# Patient Record
Sex: FEMALE | Race: WHITE | NOT HISPANIC OR LATINO | Employment: OTHER | URBAN - METROPOLITAN AREA
[De-identification: names, ages, dates, MRNs, and addresses within clinical notes are randomized per-mention and may not be internally consistent; named-entity substitution may affect disease eponyms.]

---

## 2018-05-17 ENCOUNTER — ANESTHESIA EVENT (OUTPATIENT)
Dept: ENDOSCOPY | Facility: HOSPITAL | Age: 68
End: 2018-05-17
Payer: MEDICARE

## 2018-05-18 ENCOUNTER — HOSPITAL ENCOUNTER (OUTPATIENT)
Facility: HOSPITAL | Age: 68
Discharge: HOME | End: 2018-05-18
Attending: INTERNAL MEDICINE | Admitting: INTERNAL MEDICINE
Payer: MEDICARE

## 2018-05-18 ENCOUNTER — ANESTHESIA (OUTPATIENT)
Dept: ENDOSCOPY | Facility: HOSPITAL | Age: 68
End: 2018-05-18
Payer: MEDICARE

## 2018-05-18 VITALS
RESPIRATION RATE: 20 BRPM | TEMPERATURE: 97.8 F | WEIGHT: 293 LBS | BODY MASS INDEX: 51.91 KG/M2 | HEART RATE: 69 BPM | HEIGHT: 63 IN | SYSTOLIC BLOOD PRESSURE: 147 MMHG | OXYGEN SATURATION: 96 % | DIASTOLIC BLOOD PRESSURE: 67 MMHG

## 2018-05-18 DIAGNOSIS — Z86.0100 HISTORY OF COLON POLYPS: ICD-10-CM

## 2018-05-18 PROCEDURE — 63600000 HC DRUGS/DETAIL CODE: Performed by: NURSE ANESTHETIST, CERTIFIED REGISTERED

## 2018-05-18 PROCEDURE — 88305 TISSUE EXAM BY PATHOLOGIST: CPT | Performed by: INTERNAL MEDICINE

## 2018-05-18 PROCEDURE — 37000002 HC ANESTHESIA MAC: Performed by: INTERNAL MEDICINE

## 2018-05-18 PROCEDURE — 75000020 HC COLONSCOPY SNARE: Performed by: INTERNAL MEDICINE

## 2018-05-18 PROCEDURE — 25000000 HC PHARMACY GENERAL: Performed by: NURSE ANESTHETIST, CERTIFIED REGISTERED

## 2018-05-18 PROCEDURE — 0DBK8ZZ EXCISION OF ASCENDING COLON, VIA NATURAL OR ARTIFICIAL OPENING ENDOSCOPIC: ICD-10-PCS | Performed by: INTERNAL MEDICINE

## 2018-05-18 PROCEDURE — 25800000 HC PHARMACY IV SOLUTIONS: Performed by: NURSE ANESTHETIST, CERTIFIED REGISTERED

## 2018-05-18 RX ORDER — PROPOFOL 200MG/20ML
SYRINGE (ML) INTRAVENOUS AS NEEDED
Status: DISCONTINUED | OUTPATIENT
Start: 2018-05-18 | End: 2018-05-18 | Stop reason: SURG

## 2018-05-18 RX ORDER — PROPOFOL 10 MG/ML
INJECTION, EMULSION INTRAVENOUS CONTINUOUS PRN
Status: DISCONTINUED | OUTPATIENT
Start: 2018-05-18 | End: 2018-05-18 | Stop reason: SURG

## 2018-05-18 RX ORDER — SODIUM CHLORIDE 9 MG/ML
INJECTION, SOLUTION INTRAVENOUS CONTINUOUS PRN
Status: DISCONTINUED | OUTPATIENT
Start: 2018-05-18 | End: 2018-05-18 | Stop reason: SURG

## 2018-05-18 RX ORDER — LIDOCAINE HYDROCHLORIDE 10 MG/ML
INJECTION, SOLUTION EPIDURAL; INFILTRATION; INTRACAUDAL; PERINEURAL AS NEEDED
Status: DISCONTINUED | OUTPATIENT
Start: 2018-05-18 | End: 2018-05-18 | Stop reason: SURG

## 2018-05-18 RX ORDER — ROSUVASTATIN CALCIUM 5 MG/1
5 TABLET, COATED ORAL EVERY OTHER DAY
COMMUNITY

## 2018-05-18 RX ORDER — VERAPAMIL HCL 240 MG
240 TABLET, EXTENDED RELEASE ORAL NIGHTLY
COMMUNITY

## 2018-05-18 RX ORDER — NABUMETONE 750 MG/1
750 TABLET, FILM COATED ORAL 2 TIMES DAILY
COMMUNITY

## 2018-05-18 RX ORDER — GLYCOPYRROLATE 0.6MG/3ML
SYRINGE (ML) INTRAVENOUS AS NEEDED
Status: DISCONTINUED | OUTPATIENT
Start: 2018-05-18 | End: 2018-05-18 | Stop reason: SURG

## 2018-05-18 RX ADMIN — PROPOFOL 30 MG: 10 INJECTION, EMULSION INTRAVENOUS at 12:58

## 2018-05-18 RX ADMIN — PROPOFOL 125 MCG/KG/MIN: 10 INJECTION, EMULSION INTRAVENOUS at 12:58

## 2018-05-18 RX ADMIN — LIDOCAINE HYDROCHLORIDE 5 ML: 10 INJECTION, SOLUTION EPIDURAL; INFILTRATION; INTRACAUDAL; PERINEURAL at 12:58

## 2018-05-18 RX ADMIN — SODIUM CHLORIDE: 9 INJECTION, SOLUTION INTRAVENOUS at 12:47

## 2018-05-18 RX ADMIN — PROPOFOL 10 MG: 10 INJECTION, EMULSION INTRAVENOUS at 13:01

## 2018-05-18 RX ADMIN — PROPOFOL 20 MG: 10 INJECTION, EMULSION INTRAVENOUS at 12:59

## 2018-05-18 RX ADMIN — GLYCOPYRROLATE 0.2 MG: 0.2 INJECTION INTRAMUSCULAR; INTRAVENOUS at 12:48

## 2018-05-18 RX ADMIN — PROPOFOL 20 MG: 10 INJECTION, EMULSION INTRAVENOUS at 13:02

## 2018-05-18 ASSESSMENT — PAIN SCALES - GENERAL: PAIN_LEVEL: 0

## 2018-05-18 NOTE — ANESTHESIA POSTPROCEDURE EVALUATION
Patient: Veronica Constantino    Procedure Summary     Date:  05/18/18 Room / Location:  [facility] (D[facility]    Anesthesia Start:  1247 Anesthesia Stop:  1324    Procedure:  FLEXIBLE COLONOSCOPY PROXIMAL TO SPLENIC FLEXURE WITH REMOVAL OF TUMOR USING SNARE (N/A Anus) Diagnosis:  (Personal History)    Provider:  Moo Bliss MD Responsible Provider:  Christopher Dominguez MD    Anesthesia Type:  MAC ASA Status:  3          Anesthesia Type: MAC  PACU Vitals  5/18/2018 1319 - 5/18/2018 1339      5/18/2018 1323             BP: (!)  100/49    Temp: 36.6 °C (97.8 °F)    Pulse: 60    Resp: 14    SpO2: 98 %            Anesthesia Post Evaluation    Pain score: 0  Pain management: satisfactory to patient  Mode of pain management: IV medication  Patient location during evaluation: PACU  Patient participation: complete - patient participated  Level of consciousness: awake and alert  Cardiovascular status: acceptable  Airway Patency: adequate  Respiratory status: acceptable  Hydration status: stable  Anesthetic complications: no

## 2018-05-18 NOTE — H&P
"   GI Consult Note          Subjective   Veronica Constantino is a 68 y.o. female who was admitted for Personal History of adenomatous Colon polyps, with last study performed in 2013. Has a father with Colon Cancer history as well.        Past Medical History:   Diagnosis Date   • Arthritis    • Bladder infection    • Gallstones    • Hemorrhoids    • Hiatal hernia    • HTN (hypertension)    • Hypercholesterolemia      Past Surgical History:   Procedure Laterality Date   • APPENDECTOMY     • CHOLECYSTECTOMY     • COLONOSCOPY       Allergies   Allergen Reactions   • Augmentin [Amoxicillin-Pot Clavulanate]      Abdominal pain         Home Medications:  •  ascorbate calcium (VITAMIN C ORAL), Take by mouth.  •  Bifidobacterium infantis (ALIGN ORAL), Take by mouth.  •  folic acid/multivit-min/lutein (CENTRUM SILVER ORAL), Take by mouth.  •  nabumetone, Take 750 mg by mouth 2 (two) times a day.  •  rosuvastatin, Take 5 mg by mouth daily.  •  valsartan-hydrochlorothiazide (DIOVAN HCT) 160-12.5 combo dose, Take by mouth daily.  •  verapamil SR, Take 240 mg by mouth nightly.        Physical Exam    Physical Exam  BP (!) 150/72   Pulse 90   Temp (!) 35.8 °C (96.5 °F) (Tympanic)   Resp 20   Ht 1.6 m (5' 3\")   Wt 136 kg (300 lb)   SpO2 95%   BMI 53.14 kg/m²     General appearance: no distress  Head: normocephalic  Lungs: clear to auscultation anteriorly   Heart: regular rate   Abdomen: soft, non-tender; bowel sounds normal; no masses, no organomegaly,obese  Neurologic: awake and alert and oriented        Assessment 68 year old female for 5 year follow up Colonoscopy with a personal history of polyps and a family history of Colon Cancer.               Moo Bliss MD  5/18/2018       "

## 2018-05-18 NOTE — ANESTHESIA PREPROCEDURE EVALUATION
Anesthesia ROS/MED HX      Pulmonary    Sleep apnea (suspected )  Cardiovascular   dyslipidemia   hypertension  GI/Hepatic   Hiatal hernia  Endo/Other   Body Habitus: Morbidly Obese      Past Surgical History:   Procedure Laterality Date   • APPENDECTOMY     • CHOLECYSTECTOMY     • COLONOSCOPY         Physical Exam    Airway   Mallampati: II   TM distance: >3 FB   Neck ROM: full  Cardiovascular    Rhythm: regular   Rate: normal  Pulmonary    Decreased breath sounds  Other Findings   Enlarged tonsils, submandibular fullness         Anesthesia Plan    Plan: MAC    Technique: MAC     Airway: natural airway / supplemental oxygen   ASA 3  Anesthetic plan and risks discussed with: patient  Induction:    intravenous

## 2018-05-18 NOTE — OP NOTE
_______________________________________________________________________________  Patient Name: Veronica Constantino          Procedure Date: 5/18/2018 12:42 PM  MRN: [id number]                     Account Number: [id number]  YOB: 1950              Age: 68  Gender: Female                        Note Status: Finalized  Attending MD: NICHOLAS POLANCO MD  _______________________________________________________________________________  Procedure:           Colonoscopy  Indications:         Colon cancer screening in patient at increased risk:  Colorectal cancer in father, High risk colon cancer  surveillance: Personal history of adenoma less than 10  mm in size, Last colonoscopy 5 years ago  Providers:           NICHOLAS POLANCO MD (Doctor), Karina Christy RN  (Nurse)  Referring MD:        Julio Ruggiero MD  Requesting Provider:  Medicines:           See the Anesthesia note for documentation of the  administered medications  Complications:       No immediate complications.  _______________________________________________________________________________  Procedure:           After I obtained informed consent, the scope was passed  under direct vision. Throughout the procedure, the  patient's blood pressure, pulse, and oxygen saturations  were monitored continuously. The Colonoscope was  introduced through the anus and advanced to the cecum,  identified by appendiceal orifice and ileocecal valve.  The colonoscopy was performed without difficulty. The  patient tolerated the procedure well. The quality of the  bowel preparation was adequate. The ileocecal valve,  appendiceal orifice, and rectum were photographed.  Findings:  Scattered small and large-mouthed diverticula were found in the sigmoid  colon and in the descending colon.  A 6 mm polyp was found in the ascending colon. The polyp was sessile.  The polyp was removed with a cold snare. Resection and retrieval were  complete. Estimated blood loss was  minimal.  The exam was otherwise without abnormality.  The retroflexed view of the distal rectum and anal verge was normal and  showed no anal or rectal abnormalities.  Impression:          - Diverticulosis in the sigmoid colon and in the  descending colon.  - One 6 mm polyp in the ascending colon, removed with a  cold snare. Resected and retrieved.  - The examination was otherwise normal.  - The distal rectum and anal verge are normal on  retroflexion view.  Recommendation:      - Patient has a contact number available for  emergencies. The signs and symptoms of potential delayed  complications were discussed with the patient. Return to  normal activities tomorrow. Written discharge  instructions were provided to the patient.  - Resume previous diet today.  - Discontinue aspirin and NSAIDs for 5 days.  - Continue present medications.  - Await pathology results.  - Repeat colonoscopy in 5 years for surveillance based  on pathology results.  - Telephone endoscopist for pathology results in 1 week.  Procedure Code(s):   --- Professional ---  37698, Colonoscopy, flexible; with removal of tumor(s),  polyp(s), or other lesion(s) by snare technique  Diagnosis Code(s):   --- Professional ---  Z80.0, Family history of malignant neoplasm of digestive  organs  Z86.010, Personal history of colonic polyps  D12.2, Benign neoplasm of ascending colon  K57.30, Diverticulosis of large intestine without  perforation or abscess without bleeding  CPT copyright 2015 [employer]. All rights reserved.  The codes documented in this report are preliminary and upon  review may  be revised to meet current compliance requirements.  Attending Participation:  I personally performed the entire procedure.  ________________  NICHOLAS POLANCO MD  5/18/2018 1:19:07 PM  This report has been signed electronically.  Number of Addenda: 0  Note Initiated On: 5/18/2018 12:42 PM

## 2018-05-21 LAB
CASE RPRT: NORMAL
CLINICAL INFO: NORMAL
PATH REPORT.FINAL DX SPEC: NORMAL
PATH REPORT.GROSS SPEC: NORMAL

## 2018-12-13 ENCOUNTER — HOSPITAL ENCOUNTER (OUTPATIENT)
Dept: RADIOLOGY | Age: 68
Discharge: HOME | End: 2018-12-13
Attending: FAMILY MEDICINE
Payer: MEDICARE

## 2018-12-13 ENCOUNTER — TRANSCRIBE ORDERS (OUTPATIENT)
Dept: RADIOLOGY | Age: 68
End: 2018-12-13

## 2018-12-13 DIAGNOSIS — M25.552 PAIN IN LEFT HIP: Primary | ICD-10-CM

## 2018-12-13 DIAGNOSIS — M25.552 PAIN IN LEFT HIP: ICD-10-CM

## 2018-12-13 PROCEDURE — 73502 X-RAY EXAM HIP UNI 2-3 VIEWS: CPT | Mod: LT

## 2019-11-02 ENCOUNTER — HOSPITAL ENCOUNTER (OUTPATIENT)
Dept: RADIOLOGY | Age: 69
Discharge: HOME | End: 2019-11-02
Attending: FAMILY MEDICINE
Payer: MEDICARE

## 2019-11-02 ENCOUNTER — TRANSCRIBE ORDERS (OUTPATIENT)
Dept: RADIOLOGY | Age: 69
End: 2019-11-02

## 2019-11-02 DIAGNOSIS — M25.512 PAIN IN LEFT SHOULDER: Primary | ICD-10-CM

## 2019-11-02 DIAGNOSIS — M25.512 PAIN IN LEFT SHOULDER: ICD-10-CM

## 2019-11-02 PROCEDURE — 73030 X-RAY EXAM OF SHOULDER: CPT | Mod: LT

## 2020-08-06 ENCOUNTER — HOSPITAL ENCOUNTER (OUTPATIENT)
Dept: RADIOLOGY | Age: 70
Discharge: HOME | End: 2020-08-06
Attending: FAMILY MEDICINE
Payer: MEDICARE

## 2020-08-06 ENCOUNTER — TRANSCRIBE ORDERS (OUTPATIENT)
Dept: RADIOLOGY | Age: 70
End: 2020-08-06

## 2020-08-06 DIAGNOSIS — M79.641 PAIN IN RIGHT HAND: Primary | ICD-10-CM

## 2020-08-06 DIAGNOSIS — M79.641 PAIN IN RIGHT HAND: ICD-10-CM

## 2020-08-06 PROCEDURE — 73130 X-RAY EXAM OF HAND: CPT | Mod: RT

## 2020-11-03 ENCOUNTER — APPOINTMENT (OUTPATIENT)
Dept: LAB | Facility: HOSPITAL | Age: 70
End: 2020-11-03
Attending: INTERNAL MEDICINE
Payer: MEDICARE

## 2020-11-03 DIAGNOSIS — E66.01 MORBID (SEVERE) OBESITY DUE TO EXCESS CALORIES (CMS/HCC): ICD-10-CM

## 2020-11-03 DIAGNOSIS — E78.5 HYPERLIPIDEMIA, UNSPECIFIED: ICD-10-CM

## 2020-11-03 DIAGNOSIS — I10 ESSENTIAL (PRIMARY) HYPERTENSION: ICD-10-CM

## 2020-11-03 DIAGNOSIS — M17.9 OSTEOARTHRITIS OF KNEE, UNSPECIFIED: ICD-10-CM

## 2020-11-03 PROCEDURE — 30099997 SPECIMEN PROCESSING

## 2021-04-15 DIAGNOSIS — Z23 ENCOUNTER FOR IMMUNIZATION: ICD-10-CM

## 2021-11-29 ENCOUNTER — HOSPITAL ENCOUNTER (EMERGENCY)
Facility: HOSPITAL | Age: 71
Discharge: HOME | End: 2021-11-29
Attending: EMERGENCY MEDICINE
Payer: MEDICARE

## 2021-11-29 ENCOUNTER — APPOINTMENT (EMERGENCY)
Dept: RADIOLOGY | Facility: HOSPITAL | Age: 71
End: 2021-11-29
Payer: MEDICARE

## 2021-11-29 VITALS
OXYGEN SATURATION: 95 % | DIASTOLIC BLOOD PRESSURE: 68 MMHG | HEART RATE: 85 BPM | SYSTOLIC BLOOD PRESSURE: 149 MMHG | RESPIRATION RATE: 18 BRPM | TEMPERATURE: 98.6 F

## 2021-11-29 DIAGNOSIS — M79.604 PAIN OF RIGHT LOWER EXTREMITY: Primary | ICD-10-CM

## 2021-11-29 PROCEDURE — 93971 EXTREMITY STUDY: CPT | Mod: RT

## 2021-11-29 PROCEDURE — 99283 EMERGENCY DEPT VISIT LOW MDM: CPT | Mod: 25

## 2021-11-29 ASSESSMENT — ENCOUNTER SYMPTOMS
DIARRHEA: 0
SHORTNESS OF BREATH: 0
ABDOMINAL PAIN: 0
ARTHRALGIAS: 1
DIZZINESS: 0
VOMITING: 0
FEVER: 0
NAUSEA: 0
DYSURIA: 0
CHILLS: 0
MYALGIAS: 1
HEMATURIA: 0
HEADACHES: 0

## 2021-11-29 NOTE — DISCHARGE INSTRUCTIONS
Return to the ED for worsening of symptoms or any problems or concerns.  It is very important to follow up with your healthcare provider for re-evaluation.  Please have a repeat US in 7-10 days if you still have persistent symptoms

## 2021-11-29 NOTE — ED ATTESTATION NOTE
I have personally seen and examined the patient.  I reviewed and agree with physician assistant / nurse practitioner’s assessment and plan of care.     Exam: Evaluation of the patient's right lower extremity reveals mild lateral calf tenderness.  There is no cord appreciated.  Homans' sign is negative.  The extremity is neurovascularly intact.    Plan: Patient was sent in for ultrasound to rule out DVT.  Study was obtained and is negative.  Recommend routine follow-up with her orthopedic surgeon for likely musculoskeletal causes           Domingo Emanuel,   11/29/21 [id number]

## 2021-11-29 NOTE — ED PROVIDER NOTES
Emergency Medicine Note  HPI   HISTORY OF PRESENT ILLNESS     71 YOF with a PMH of arthritis, HTN presents to ED for R leg pain x 5 days patient states since Thursday she has been having right-sided leg pain, patient states she first noticed it when getting in the car to go to Thanksgiving dinner, she states the car was only 15 minutes.  She since been resting and taking NSAIDs with some relief of her pain.  She denies swelling redness or weakness of the leg.  Denies history of DVTs or PEs.  She called her doctor Dr. Freitas sports medicine who told her to come to the ED for rule out  DVT.            Patient History   PAST HISTORY     Reviewed from Nursing Triage:      Past Medical History:   Diagnosis Date   • Arthritis    • Bladder infection    • Gallstones    • Hemorrhoids    • Hiatal hernia    • HTN (hypertension)    • Hypercholesterolemia        Past Surgical History:   Procedure Laterality Date   • APPENDECTOMY     • CHOLECYSTECTOMY     • COLONOSCOPY         History reviewed. No pertinent family history.    Social History     Tobacco Use   • Smoking status: Never Smoker   • Smokeless tobacco: Never Used   Substance Use Topics   • Alcohol use: No   • Drug use: No         Review of Systems   REVIEW OF SYSTEMS     Review of Systems   Constitutional: Negative for chills and fever.   Eyes: Negative for visual disturbance.   Respiratory: Negative for shortness of breath.    Cardiovascular: Negative for chest pain.   Gastrointestinal: Negative for abdominal pain, diarrhea, nausea and vomiting.   Genitourinary: Negative for dysuria and hematuria.   Musculoskeletal: Positive for arthralgias and myalgias.   Neurological: Negative for dizziness and headaches.         VITALS     ED Vitals    Date/Time Temp Pulse Resp BP SpO2 [facility]   11/29/21 1333 37 °C (98.6 °F) 85 18 149/68 95 % BL        Pulse Ox %: 95 % (11/29/21 1712)  Pulse Ox Interpretation: Normal (11/29/21 1712)  Heart Rate: 85 (11/29/21 1712)  Rhythm Strip  Interpretation: Normal Sinus Rhythm (11/29/21 1712)     Physical Exam   PHYSICAL EXAM     Physical Exam  Vitals and nursing note reviewed.   Constitutional:       Appearance: She is well-developed.   HENT:      Head: Normocephalic and atraumatic.   Eyes:      Conjunctiva/sclera: Conjunctivae normal.   Cardiovascular:      Rate and Rhythm: Normal rate and regular rhythm.   Pulmonary:      Effort: Pulmonary effort is normal.      Breath sounds: Normal breath sounds.   Abdominal:      General: There is no distension.      Palpations: Abdomen is soft. There is no mass.      Tenderness: There is no abdominal tenderness.   Musculoskeletal:         General: No deformity. Normal range of motion.      Cervical back: Normal range of motion.      Right knee: Tenderness present.      Comments: Mild tenderness overlying lateral R knee without swelling, erythema or warmth; no palpable mass  NV intact distally    Skin:     General: Skin is warm and dry.   Neurological:      General: No focal deficit present.      Mental Status: She is alert. Mental status is at baseline.   Psychiatric:         Behavior: Behavior normal.           PROCEDURES     Procedures     DATA     Results     None          Imaging Results          US venous leg right (Final result)  Result time 11/29/21 14:42:24    Final result                 Impression:    IMPRESSION:  No sonographic evidence of acute deep venous thrombosis in the femoral-popliteal  system in the right lower extremity.    COMMENT:  Real-time duplex sonography of the deep veins of the right lower extremity was  performed with color and spectral Doppler including compression.    The common femoral, superficial femoral, popliteal  and calf veins are normally  compressible with spontaneous phasic wave forms in the right lower extremity.  No intraluminal filling defect is identified. There is normal color Doppler  flow.                   Narrative:      CLINICAL HISTORY:Right lower extremity  pain                                No orders to display       Scoring tools                                 ED Course & MDM   MDM / ED COURSE and CLINICAL IMPRESSIONS     MDM    ED Course as of 11/30/21 0147 Mon Nov 29, 2021   1713 None patient's DVT study negative.  Patient informed to continue with symptomatic management and follow-up with Dr. Freitas.  Informed if she continues to have pain in 7 to 10 days to have a repeat ultrasound. [EF]      ED Course User Index  [EF] Frankel, Elena C, PA C         Clinical Impressions as of 11/30/21 0147   Pain of right lower extremity            Frankel, Elena C, PA C  11/30/21 0147

## 2023-02-21 ENCOUNTER — APPOINTMENT (OUTPATIENT)
Dept: LAB | Facility: HOSPITAL | Age: 73
End: 2023-02-21
Attending: INTERNAL MEDICINE
Payer: MEDICARE

## 2023-02-21 ENCOUNTER — TRANSCRIBE ORDERS (OUTPATIENT)
Dept: LAB | Facility: HOSPITAL | Age: 73
End: 2023-02-21

## 2023-02-21 DIAGNOSIS — E78.5 HYPERLIPIDEMIA, UNSPECIFIED: ICD-10-CM

## 2023-02-21 DIAGNOSIS — I10 ESSENTIAL (PRIMARY) HYPERTENSION: ICD-10-CM

## 2023-02-21 DIAGNOSIS — E78.5 HYPERLIPIDEMIA, UNSPECIFIED: Primary | ICD-10-CM

## 2023-02-21 DIAGNOSIS — E66.01 MORBID (SEVERE) OBESITY DUE TO EXCESS CALORIES (CMS/HCC): ICD-10-CM

## 2023-02-21 DIAGNOSIS — M17.9 OSTEOARTHRITIS OF KNEE, UNSPECIFIED: ICD-10-CM

## 2023-02-21 LAB
ALBUMIN SERPL-MCNC: 3.8 G/DL (ref 3.4–5)
ALP SERPL-CCNC: 60 IU/L (ref 35–126)
ALT SERPL-CCNC: 28 IU/L (ref 11–54)
ANION GAP SERPL CALC-SCNC: 12 MEQ/L (ref 3–15)
AST SERPL-CCNC: 27 IU/L (ref 15–41)
BASOPHILS # BLD: 0.04 K/UL (ref 0.01–0.1)
BASOPHILS NFR BLD: 0.6 %
BILIRUB SERPL-MCNC: 1 MG/DL (ref 0.3–1.2)
BUN SERPL-MCNC: 16 MG/DL (ref 8–20)
CALCIUM SERPL-MCNC: 9.9 MG/DL (ref 8.9–10.3)
CHLORIDE SERPL-SCNC: 104 MEQ/L (ref 98–109)
CHOLEST SERPL-MCNC: 126 MG/DL
CO2 SERPL-SCNC: 25 MEQ/L (ref 22–32)
CREAT SERPL-MCNC: 0.9 MG/DL (ref 0.6–1.1)
DIFFERENTIAL METHOD BLD: ABNORMAL
EOSINOPHIL # BLD: 0.15 K/UL (ref 0.04–0.36)
EOSINOPHIL NFR BLD: 2.4 %
ERYTHROCYTE [DISTWIDTH] IN BLOOD BY AUTOMATED COUNT: 12.5 % (ref 11.7–14.4)
FT4I SERPL CALC-MCNC: 4.45 (ref 2.1–3.8)
GFR SERPL CREATININE-BSD FRML MDRD: >60 ML/MIN/1.73M*2
GLUCOSE SERPL-MCNC: 93 MG/DL (ref 70–99)
HCT VFR BLDCO AUTO: 46.6 % (ref 35–45)
HDLC SERPL-MCNC: 27 MG/DL
HDLC SERPL: 4.7 {RATIO}
HGB BLD-MCNC: 15.4 G/DL (ref 11.8–15.7)
IMM GRANULOCYTES # BLD AUTO: 0.02 K/UL (ref 0–0.08)
IMM GRANULOCYTES NFR BLD AUTO: 0.3 %
LDH SERPL L TO P-CCNC: 228 IU/L (ref 98–192)
LDLC SERPL CALC-MCNC: 71 MG/DL
LYMPHOCYTES # BLD: 2.94 K/UL (ref 1.2–3.5)
LYMPHOCYTES NFR BLD: 47.4 %
MAGNESIUM SERPL-MCNC: 1.7 MG/DL (ref 1.8–2.5)
MCH RBC QN AUTO: 31.8 PG (ref 28–33.2)
MCHC RBC AUTO-ENTMCNC: 33 G/DL (ref 32.2–35.5)
MCV RBC AUTO: 96.1 FL (ref 83–98)
MONOCYTES # BLD: 0.59 K/UL (ref 0.28–0.8)
MONOCYTES NFR BLD: 9.5 %
NEUTROPHILS # BLD: 2.46 K/UL (ref 1.7–7)
NEUTS SEG NFR BLD: 39.8 %
NONHDLC SERPL-MCNC: 99 MG/DL
NRBC BLD-RTO: 0 %
PDW BLD AUTO: 11.8 FL (ref 9.4–12.3)
PHOSPHATE SERPL-MCNC: 3 MG/DL (ref 2.4–4.7)
PLATELET # BLD AUTO: 199 K/UL (ref 150–369)
POTASSIUM SERPL-SCNC: 4 MEQ/L (ref 3.6–5.1)
PROT SERPL-MCNC: 6 G/DL (ref 6–8.2)
RBC # BLD AUTO: 4.85 M/UL (ref 3.93–5.22)
SODIUM SERPL-SCNC: 141 MEQ/L (ref 136–144)
T4 SERPL-MCNC: 10.6 UG/DL (ref 5.9–12.2)
TRIGL SERPL-MCNC: 139 MG/DL (ref 30–149)
TSH SERPL DL<=0.05 MIU/L-ACNC: 2.22 MIU/L (ref 0.34–5.6)
URATE SERPL-MCNC: 6.4 MG/DL (ref 2.6–8)
WBC # BLD AUTO: 6.2 K/UL (ref 3.8–10.5)

## 2023-02-21 PROCEDURE — 84436 ASSAY OF TOTAL THYROXINE: CPT

## 2023-02-21 PROCEDURE — 84550 ASSAY OF BLOOD/URIC ACID: CPT

## 2023-02-21 PROCEDURE — 80061 LIPID PANEL: CPT

## 2023-02-21 PROCEDURE — 83615 LACTATE (LD) (LDH) ENZYME: CPT

## 2023-02-21 PROCEDURE — 84100 ASSAY OF PHOSPHORUS: CPT

## 2023-02-21 PROCEDURE — 84443 ASSAY THYROID STIM HORMONE: CPT

## 2023-02-21 PROCEDURE — 85025 COMPLETE CBC W/AUTO DIFF WBC: CPT

## 2023-02-21 PROCEDURE — 36415 COLL VENOUS BLD VENIPUNCTURE: CPT

## 2023-02-21 PROCEDURE — 83735 ASSAY OF MAGNESIUM: CPT

## 2023-02-21 PROCEDURE — 80053 COMPREHEN METABOLIC PANEL: CPT

## 2024-11-06 ENCOUNTER — TRANSCRIBE ORDERS (OUTPATIENT)
Dept: LAB | Facility: HOSPITAL | Age: 74
End: 2024-11-06

## 2024-11-06 ENCOUNTER — HOSPITAL ENCOUNTER (OUTPATIENT)
Dept: RADIOLOGY | Age: 74
Discharge: HOME | End: 2024-11-06
Attending: FAMILY MEDICINE
Payer: MEDICARE

## 2024-11-06 ENCOUNTER — TRANSCRIBE ORDERS (OUTPATIENT)
Dept: RADIOLOGY | Age: 74
End: 2024-11-06

## 2024-11-06 ENCOUNTER — APPOINTMENT (OUTPATIENT)
Dept: LAB | Facility: HOSPITAL | Age: 74
End: 2024-11-06
Attending: INTERNAL MEDICINE
Payer: MEDICARE

## 2024-11-06 DIAGNOSIS — D53.9 NUTRITIONAL ANEMIA, UNSPECIFIED: ICD-10-CM

## 2024-11-06 DIAGNOSIS — M79.641 PAIN IN RIGHT HAND: ICD-10-CM

## 2024-11-06 DIAGNOSIS — M25.511 PAIN IN RIGHT SHOULDER: ICD-10-CM

## 2024-11-06 DIAGNOSIS — R94.5 ABNORMAL RESULTS OF LIVER FUNCTION STUDIES: ICD-10-CM

## 2024-11-06 DIAGNOSIS — E03.9 HYPOTHYROIDISM, UNSPECIFIED: ICD-10-CM

## 2024-11-06 DIAGNOSIS — M54.2 CERVICALGIA: ICD-10-CM

## 2024-11-06 DIAGNOSIS — I10 ESSENTIAL (PRIMARY) HYPERTENSION: ICD-10-CM

## 2024-11-06 DIAGNOSIS — E78.00 PURE HYPERCHOLESTEROLEMIA: Primary | ICD-10-CM

## 2024-11-06 DIAGNOSIS — E78.00 PURE HYPERCHOLESTEROLEMIA: ICD-10-CM

## 2024-11-06 DIAGNOSIS — E55.9 VITAMIN D DEFICIENCY, UNSPECIFIED: ICD-10-CM

## 2024-11-06 DIAGNOSIS — Z13.820 ENCOUNTER FOR SCREENING FOR OSTEOPOROSIS: Primary | ICD-10-CM

## 2024-11-06 LAB
25(OH)D3 SERPL-MCNC: 20 NG/ML (ref 30–100)
ALBUMIN SERPL-MCNC: 4 G/DL (ref 3.5–5.7)
ALP SERPL-CCNC: 65 IU/L (ref 34–125)
ALT SERPL-CCNC: 15 IU/L (ref 7–52)
ANION GAP SERPL CALC-SCNC: 8 MEQ/L (ref 3–15)
AST SERPL-CCNC: 20 IU/L (ref 13–39)
BASOPHILS # BLD: 0.05 K/UL (ref 0.01–0.1)
BASOPHILS NFR BLD: 1 %
BILIRUB SERPL-MCNC: 0.7 MG/DL (ref 0.3–1.2)
BUN SERPL-MCNC: 15 MG/DL (ref 7–25)
CALCIUM SERPL-MCNC: 10 MG/DL (ref 8.6–10.3)
CHLORIDE SERPL-SCNC: 105 MEQ/L (ref 98–107)
CHOLEST SERPL-MCNC: 122 MG/DL
CO2 SERPL-SCNC: 28 MEQ/L (ref 21–31)
CREAT SERPL-MCNC: 0.8 MG/DL (ref 0.6–1.2)
DIFFERENTIAL METHOD BLD: ABNORMAL
EGFRCR SERPLBLD CKD-EPI 2021: >60 ML/MIN/1.73M*2
EOSINOPHIL # BLD: 0.2 K/UL (ref 0.04–0.36)
EOSINOPHIL NFR BLD: 3.9 %
ERYTHROCYTE [DISTWIDTH] IN BLOOD BY AUTOMATED COUNT: 12.5 % (ref 11.7–14.4)
FT4I SERPL CALC-MCNC: 5.38 (ref 2.1–3.8)
GLUCOSE SERPL-MCNC: 92 MG/DL (ref 70–99)
HCT VFR BLD AUTO: 45 % (ref 35–45)
HDLC SERPL-MCNC: 28 MG/DL
HDLC SERPL: 4.4 {RATIO}
HGB BLD-MCNC: 14.9 G/DL (ref 11.8–15.7)
IMM GRANULOCYTES # BLD AUTO: 0.01 K/UL (ref 0–0.08)
IMM GRANULOCYTES NFR BLD AUTO: 0.2 %
LDH SERPL L TO P-CCNC: 175 IU/L (ref 98–271)
LDLC SERPL CALC-MCNC: 62 MG/DL
LYMPHOCYTES # BLD: 1.74 K/UL (ref 1.2–3.5)
LYMPHOCYTES NFR BLD: 33.7 %
MAGNESIUM SERPL-MCNC: 1.7 MG/DL (ref 1.8–2.5)
MCH RBC QN AUTO: 32.3 PG (ref 28–33.2)
MCHC RBC AUTO-ENTMCNC: 33.1 G/DL (ref 32.2–35.5)
MCV RBC AUTO: 97.4 FL (ref 83–98)
MONOCYTES # BLD: 0.54 K/UL (ref 0.28–0.8)
MONOCYTES NFR BLD: 10.5 %
NEUTROPHILS # BLD: 2.62 K/UL (ref 1.7–7)
NEUTS SEG NFR BLD: 50.7 %
NONHDLC SERPL-MCNC: 94 MG/DL
NRBC BLD-RTO: 0 %
PHOSPHATE SERPL-MCNC: 3.1 MG/DL (ref 2.4–4.7)
PLATELET # BLD AUTO: 194 K/UL (ref 150–369)
PMV BLD AUTO: 12.8 FL (ref 9.4–12.3)
POTASSIUM SERPL-SCNC: 4.1 MEQ/L (ref 3.5–5.1)
PROT SERPL-MCNC: 6.8 G/DL (ref 6–8.2)
RBC # BLD AUTO: 4.62 M/UL (ref 3.93–5.22)
SODIUM SERPL-SCNC: 141 MEQ/L (ref 136–145)
T4 SERPL-MCNC: 11.2 UG/DL (ref 5.9–12.2)
TRIGL SERPL-MCNC: 159 MG/DL
TSH SERPL DL<=0.05 MIU/L-ACNC: 2.08 MIU/L (ref 0.34–5.6)
URATE SERPL-MCNC: 6.7 MG/DL (ref 2.3–6)
WBC # BLD AUTO: 5.16 K/UL (ref 3.8–10.5)

## 2024-11-06 PROCEDURE — 80061 LIPID PANEL: CPT

## 2024-11-06 PROCEDURE — 72050 X-RAY EXAM NECK SPINE 4/5VWS: CPT

## 2024-11-06 PROCEDURE — 84443 ASSAY THYROID STIM HORMONE: CPT

## 2024-11-06 PROCEDURE — 84100 ASSAY OF PHOSPHORUS: CPT

## 2024-11-06 PROCEDURE — 85025 COMPLETE CBC W/AUTO DIFF WBC: CPT

## 2024-11-06 PROCEDURE — 84550 ASSAY OF BLOOD/URIC ACID: CPT

## 2024-11-06 PROCEDURE — 73130 X-RAY EXAM OF HAND: CPT | Mod: RT

## 2024-11-06 PROCEDURE — 83615 LACTATE (LD) (LDH) ENZYME: CPT

## 2024-11-06 PROCEDURE — 82306 VITAMIN D 25 HYDROXY: CPT

## 2024-11-06 PROCEDURE — 36415 COLL VENOUS BLD VENIPUNCTURE: CPT

## 2024-11-06 PROCEDURE — 80053 COMPREHEN METABOLIC PANEL: CPT

## 2024-11-06 PROCEDURE — 83735 ASSAY OF MAGNESIUM: CPT

## 2024-11-06 PROCEDURE — 73030 X-RAY EXAM OF SHOULDER: CPT | Mod: RT

## 2024-11-06 PROCEDURE — 84436 ASSAY OF TOTAL THYROXINE: CPT

## 2025-01-14 ENCOUNTER — ANESTHESIA EVENT (OUTPATIENT)
Dept: ENDOSCOPY | Facility: HOSPITAL | Age: 75
End: 2025-01-14
Payer: MEDICARE

## 2025-01-15 ENCOUNTER — ANESTHESIA (OUTPATIENT)
Dept: ENDOSCOPY | Facility: HOSPITAL | Age: 75
End: 2025-01-15
Payer: MEDICARE

## 2025-01-15 ENCOUNTER — HOSPITAL ENCOUNTER (OUTPATIENT)
Facility: HOSPITAL | Age: 75
Discharge: HOME | End: 2025-01-15
Attending: INTERNAL MEDICINE | Admitting: INTERNAL MEDICINE
Payer: MEDICARE

## 2025-01-15 VITALS
HEART RATE: 59 BPM | SYSTOLIC BLOOD PRESSURE: 146 MMHG | OXYGEN SATURATION: 98 % | TEMPERATURE: 96.7 F | WEIGHT: 265 LBS | DIASTOLIC BLOOD PRESSURE: 65 MMHG | RESPIRATION RATE: 22 BRPM | HEIGHT: 63 IN | BODY MASS INDEX: 46.95 KG/M2

## 2025-01-15 DIAGNOSIS — Z86.0100 HX OF COLONIC POLYP: ICD-10-CM

## 2025-01-15 DIAGNOSIS — Z80.0 FAMILY HISTORY OF COLON CANCER: ICD-10-CM

## 2025-01-15 LAB
ATRIAL RATE: 67
P AXIS: 29
PR INTERVAL: 124
QRS DURATION: 90
QT INTERVAL: 430
QTC CALCULATION(BAZETT): 454
R AXIS: 13
T WAVE AXIS: 59
VENTRICULAR RATE: 67

## 2025-01-15 PROCEDURE — 93005 ELECTROCARDIOGRAM TRACING: CPT | Performed by: INTERNAL MEDICINE

## 2025-01-15 PROCEDURE — 71000001 HC PACU PHASE 1 INITIAL 30MIN: Performed by: INTERNAL MEDICINE

## 2025-01-15 PROCEDURE — 93010 ELECTROCARDIOGRAM REPORT: CPT | Performed by: INTERNAL MEDICINE

## 2025-01-15 PROCEDURE — C1773 RET DEV, INSERTABLE: HCPCS | Performed by: INTERNAL MEDICINE

## 2025-01-15 PROCEDURE — 0DBL8ZX EXCISION OF TRANSVERSE COLON, VIA NATURAL OR ARTIFICIAL OPENING ENDOSCOPIC, DIAGNOSTIC: ICD-10-PCS | Performed by: INTERNAL MEDICINE

## 2025-01-15 PROCEDURE — 0DBF8ZX EXCISION OF RIGHT LARGE INTESTINE, VIA NATURAL OR ARTIFICIAL OPENING ENDOSCOPIC, DIAGNOSTIC: ICD-10-PCS | Performed by: INTERNAL MEDICINE

## 2025-01-15 PROCEDURE — 75000020 HC COLONSCOPY SNARE: Performed by: INTERNAL MEDICINE

## 2025-01-15 PROCEDURE — 63600000 HC DRUGS/DETAIL CODE: Mod: JZ

## 2025-01-15 PROCEDURE — 25800000 HC PHARMACY IV SOLUTIONS

## 2025-01-15 PROCEDURE — 71000011 HC PACU PHASE 1 EA ADDL MIN: Performed by: INTERNAL MEDICINE

## 2025-01-15 PROCEDURE — 88305 TISSUE EXAM BY PATHOLOGIST: CPT | Performed by: INTERNAL MEDICINE

## 2025-01-15 PROCEDURE — 37000001 HC ANESTHESIA GENERAL: Performed by: INTERNAL MEDICINE

## 2025-01-15 RX ORDER — SODIUM CHLORIDE 9 MG/ML
INJECTION, SOLUTION INTRAVENOUS CONTINUOUS PRN
Status: DISCONTINUED | OUTPATIENT
Start: 2025-01-15 | End: 2025-01-15 | Stop reason: SURG

## 2025-01-15 RX ORDER — LIDOCAINE HCL/PF 100 MG/5ML
SYRINGE (ML) INTRAVENOUS AS NEEDED
Status: DISCONTINUED | OUTPATIENT
Start: 2025-01-15 | End: 2025-01-15 | Stop reason: SURG

## 2025-01-15 RX ORDER — PROPOFOL 10 MG/ML
INJECTION, EMULSION INTRAVENOUS AS NEEDED
Status: DISCONTINUED | OUTPATIENT
Start: 2025-01-15 | End: 2025-01-15 | Stop reason: SURG

## 2025-01-15 RX ADMIN — SODIUM CHLORIDE: 9 INJECTION, SOLUTION INTRAVENOUS at 09:31

## 2025-01-15 RX ADMIN — PROPOFOL 125 MCG/KG/MIN: 10 INJECTION, EMULSION INTRAVENOUS at 09:37

## 2025-01-15 RX ADMIN — PROPOFOL 30 MG: 10 INJECTION, EMULSION INTRAVENOUS at 09:42

## 2025-01-15 RX ADMIN — LIDOCAINE HYDROCHLORIDE 100 MG: 20 INJECTION, SOLUTION INTRAVENOUS at 09:38

## 2025-01-15 RX ADMIN — PROPOFOL 50 MG: 10 INJECTION, EMULSION INTRAVENOUS at 09:38

## 2025-01-15 NOTE — ANESTHESIA PREPROCEDURE EVALUATION
Relevant Problems   No relevant active problems       Anesthesia ROS/MED HX    Anesthesia History - neg  Pulmonary - neg  Neuro/Psych - neg  Cardiovascular   dyslipidemia   hypertension  Hematological - neg  GI/Hepatic   Hiatal hernia  Musculoskeletal- neg  Renal Disease- neg  Endo/Other- neg       Past Surgical History   Procedure Laterality Date    Appendectomy      Cholecystectomy      Colonoscopy      FLEXIBLE COLONOSCOPY PROXIMAL TO SPLENIC FLEXURE WITH REMOVAL OF TUMOR USING SNARE N/A 5/18/2018    [place]oo [place] [facility]       Physical Exam    Airway   Mallampati: II   TM distance: >3 FB   Neck ROM: full  Cardiovascular - normal   Rhythm: regular   Rate: normalPulmonary - normal   clear to auscultation  Dental - normal        Anesthesia Plan    Plan: general    Technique: total IV anesthesia     Lines and Monitors: PIV     Airway: natural airway / supplemental oxygen    3 ASA  Induction:    intravenous

## 2025-01-15 NOTE — OP NOTE
_______________________________________________________________________________  Patient Name: Veronica Constantino          Procedure Date: 1/15/2025 9:18 AM  MRN: [id number]                     Account Number: [id number]  YOB: 1950              Age: 74  Gender: Female                        Note Status: Finalized  Attending MD: LUIS DE LA PAZ,  _______________________________________________________________________________  Procedure:             Colonoscopy  Indications:           Colon cancer screening in patient at increased risk:  Colorectal cancer in father, High risk colon cancer  surveillance: Personal history of adenoma less than 10  mm in size  Providers:             LUIS CRANE (Doctor)  Referring MD:          Julio Ruggiero  Requesting Provider:  Medicines:             See the Anesthesia note for documentation of the  administered medications  Complications:         No immediate complications.  _______________________________________________________________________________  Procedure:             After I obtained informed consent, the scope was  passed under direct vision. Throughout the procedure,  the patient's blood pressure, pulse, and oxygen  saturations were monitored continuously. The  colonoscope was introduced through the anus and  advanced to the terminal ileum, with identification of  the appendiceal orifice and IC valve. The colonoscopy  was performed without difficulty. The patient  tolerated the procedure well. The quality of the bowel  preparation was good. The terminal ileum, ileocecal  valve, appendiceal orifice, and rectum were  photographed.  Findings:  The terminal ileum appeared normal.  An 8 mm polyp was found in the ascending colon. The polyp was sessile.  The polyp was removed with a hot snare. Resection and retrieval were  complete. Estimated blood loss: none.  A 10 mm polyp was found in the transverse colon. The polyp was sessile.  The polyp was  removed with a hot snare. Resection and retrieval were  complete. Estimated blood loss: none.  Scattered large-mouthed and small-mouthed diverticula were found in the  hepatic flexure, ascending colon and cecum.  The exam was otherwise without abnormality.  The retroflexed view of the distal rectum and anal verge was normal and  showed no anal or rectal abnormalities.  Impression:            - The examined portion of the ileum was normal.  - One 8 mm polyp in the ascending colon, removed with  a hot snare. Resected and retrieved.  - One 10 mm polyp in the transverse colon, removed  with a hot snare. Resected and retrieved.  - Diverticulosis at the hepatic flexure, in the  ascending colon and in the cecum.  - The examination was otherwise normal.  - The distal rectum and anal verge are normal on  retroflexion view.  Recommendation:        - Patient has a contact number available for  emergencies. The signs and symptoms of potential  delayed complications were discussed with the patient.  Return to normal activities tomorrow. Written  discharge instructions were provided to the patient.  - Resume previous diet today.  - Continue present medications.  - No aspirin, ibuprofen, naproxen, or other  non-steroidal anti-inflammatory drugs for 5 days after  polyp removal.  - Await pathology results.  - Repeat colonoscopy in 3 years for surveillance based  on pathology results.  - Telephone endoscopist for pathology results in 10  days.  Procedure Code(s):     --- Professional ---  26349, Colonoscopy, flexible; with removal of  tumor(s), polyp(s), or other lesion(s) by snare  technique  Diagnosis Code(s):     --- Professional ---  Z80.0, Family history of malignant neoplasm of  digestive organs  Z86.010, Personal history of colonic polyps  D12.2, Benign neoplasm of ascending colon  D12.3, Benign neoplasm of transverse colon (hepatic  flexure or splenic flexure)  K57.30, Diverticulosis of large intestine without  perforation or  abscess without bleeding  CPT copyright 2023 American Medical Association. All rights reserved.  The codes documented in this report are preliminary and upon  review may  be revised to meet current compliance requirements.  Attending Participation:  I personally performed the entire procedure.  ______________________  NICHOLAS POLANCO MD~COLLIN  1/15/2025 10:01:27 AM  This report has been signed electronically.  Number of Addenda: 0  Note Initiated On: 1/15/2025 9:18 AM

## 2025-01-15 NOTE — ANESTHESIA POSTPROCEDURE EVALUATION
Patient: Veronica Constantino    Procedure Summary       Date: 01/15/25 Room / [facility] (B[facility]    Anesthesia Start: 0931 Anesthesia Stop: 1007    Procedure: Colonoscopy (Anus) Diagnosis:       Family history of colon cancer      Hx of colonic polyp      (fam hx of crc)      (per hx colon polyps)    Providers: Moo Bliss MD Responsible Provider: Theo Watters MD    Anesthesia Type: general ASA Status: 3            Anesthesia Type: general  PACU Vitals  1/15/2025 1001 - 1/15/2025 1057        1/15/2025  1004 1/15/2025  1011 1/15/2025  1020 1/15/2025  1030    BP: 114/58 133/59 148/66 146/65    Temp: 35.9 °C (96.7 °F) -- -- --    Pulse: 56 58 62 59    Resp: 20 20 20 22    SpO2: 100 % 100 % 99 % 98 %              Anesthesia Post Evaluation    Pain management: adequate  Patient location during evaluation: PACU  Patient participation: complete - patient participated  Level of consciousness: awake and alert  Cardiovascular status: acceptable  Airway Patency: adequate  Respiratory status: acceptable  Hydration status: acceptable  Anesthetic complications: no

## 2025-01-15 NOTE — ANESTHESIOLOGIST PRE-PROCEDURE ATTESTATION
Pre-Procedure Patient Identification:  I am the Primary Anesthesiologist and have identified the patient on 01/15/25 at 8:50 AM.   I have confirmed the procedure(s) will be performed by the following surgeon/proceduralist Moo Bliss MD.

## 2025-01-15 NOTE — H&P
"   GI Consult Note          Subjective   Veronica Constantino is a 74 y.o. female who was admitted for Family history of colon cancer [Z80.0]  Hx of colonic polyp [Z86.0100]. Last Colonoscopy in 2018 with one Adenoma. Father with a history of Colon Cancer as well.        Past Medical History:   Diagnosis Date    Arthritis     Bladder infection     Gallstones     Hemorrhoids     Hiatal hernia     HTN (hypertension)     Hypercholesterolemia      Past Surgical History   Procedure Laterality Date    Appendectomy      Cholecystectomy      Colonoscopy      FLEXIBLE COLONOSCOPY PROXIMAL TO SPLENIC FLEXURE WITH REMOVAL OF TUMOR USING SNARE N/A 5/18/2018    Performed by Moo Bliss MD at [facility]     Allergies   Allergen Reactions    Augmentin [Amoxicillin-Pot Clavulanate]      Abdominal pain         Home Medications:          Physical Exam    Physical Exam  Visit Vitals  BP (!) 184/84   Pulse 80   Temp 36.7 °C (98.1 °F) (Temporal)   Resp 18   Ht 1.6 m (5' 3\")   Wt 120 kg (265 lb)   SpO2 97%   BMI 46.94 kg/m²       General appearance: no distress  Head: normocephalic  Lungs: clear to auscultation anteriorly   Heart: regular rate   Abdomen: soft, non-tender; bowel sounds normal; no masses, no organomegaly  Neurologic: awake and alert and oriented        Assessment 74 year old female for follow up Colonoscopy with a family hx of Colon Cancer and a personal hx of Colon polyps.               Moo Bliss MD  1/15/2025         "

## 2025-02-23 ENCOUNTER — APPOINTMENT (OUTPATIENT)
Dept: RADIOLOGY | Age: 75
End: 2025-02-23
Attending: FAMILY MEDICINE
Payer: MEDICARE

## 2025-02-23 ENCOUNTER — HOSPITAL ENCOUNTER (OUTPATIENT)
Facility: CLINIC | Age: 75
Discharge: HOME | End: 2025-02-23
Attending: FAMILY MEDICINE
Payer: MEDICARE

## 2025-02-23 VITALS
TEMPERATURE: 98.1 F | HEART RATE: 75 BPM | DIASTOLIC BLOOD PRESSURE: 68 MMHG | SYSTOLIC BLOOD PRESSURE: 110 MMHG | OXYGEN SATURATION: 95 %

## 2025-02-23 DIAGNOSIS — R05.1 ACUTE COUGH: ICD-10-CM

## 2025-02-23 DIAGNOSIS — J01.00 ACUTE NON-RECURRENT MAXILLARY SINUSITIS: Primary | ICD-10-CM

## 2025-02-23 PROCEDURE — 99202 OFFICE O/P NEW SF 15 MIN: CPT | Performed by: FAMILY MEDICINE

## 2025-02-23 PROCEDURE — 71046 X-RAY EXAM CHEST 2 VIEWS: CPT | Performed by: FAMILY MEDICINE

## 2025-02-23 PROCEDURE — 87637 SARSCOV2&INF A&B&RSV AMP PRB: CPT | Performed by: FAMILY MEDICINE

## 2025-02-23 ASSESSMENT — ENCOUNTER SYMPTOMS
SHORTNESS OF BREATH: 0
FEVER: 1
ABDOMINAL PAIN: 0
COUGH: 1
CHILLS: 1
NAUSEA: 0
VOMITING: 0

## 2025-02-23 NOTE — ED PROVIDER NOTES
"History  Chief Complaint   Patient presents with    Cough     Productive cough of \"creamy\" yellow mucus x 4 days.     Intense cough with malaise.    Started 3 days ago.    Temp 101 Friday.  Yesterday, temp resolved.    Cough is worsening.        Cough  Associated symptoms: chills and fever    Associated symptoms: no chest pain and no shortness of breath        Past Medical History:   Diagnosis Date    Arthritis     Bladder infection     Gallstones     Hemorrhoids     Hiatal hernia     HTN (hypertension)     Hypercholesterolemia        Past Surgical History   Procedure Laterality Date    Appendectomy      Cholecystectomy      Colonoscopy      Colonoscopy N/A 1/15/2025    Performed by Moo Bliss MD at [facility]    FLEXIBLE COLONOSCOPY PROXIMAL TO SPLENIC FLEXURE WITH REMOVAL OF TUMOR USING SNARE N/A 5/18/2018    Performed by Moo Bliss MD at [facility]       No family history on file.    Social History     Tobacco Use    Smoking status: Never    Smokeless tobacco: Never   Substance Use Topics    Alcohol use: No    Drug use: No       Review of Systems   Constitutional:  Positive for chills and fever.   Respiratory:  Positive for cough. Negative for shortness of breath.    Cardiovascular:  Negative for chest pain.   Gastrointestinal:  Negative for abdominal pain, nausea and vomiting.       Physical Exam  ED Triage Vitals [02/23/25 1541]   Temp Heart Rate Resp BP SpO2   36.7 °C (98.1 °F) 75 -- 110/68 95 %      Temp Source Heart Rate Source Patient Position BP Location FiO2 (%) (Set)   Oral Monitor Sitting Right upper arm --       Physical Exam  Vitals and nursing note reviewed.   Constitutional:       General: She is not in acute distress.     Appearance: Normal appearance. She is well-developed. She is not ill-appearing.   HENT:      Head: Normocephalic and atraumatic.      Right Ear: Ear canal and external ear normal. A middle ear effusion is present. Tympanic membrane is not erythematous.      Left Ear: Ear canal and " external ear normal. Tympanic membrane is not erythematous.      Nose: Mucosal edema and congestion present.      Mouth/Throat:      Pharynx: Postnasal drip present. No posterior oropharyngeal erythema.   Eyes:      Conjunctiva/sclera: Conjunctivae normal.   Cardiovascular:      Rate and Rhythm: Normal rate and regular rhythm.   Pulmonary:      Effort: Pulmonary effort is normal. No tachypnea or respiratory distress.      Breath sounds: Normal breath sounds. No decreased breath sounds, wheezing, rhonchi or rales.   Musculoskeletal:      Cervical back: Neck supple.   Lymphadenopathy:      Cervical: Cervical adenopathy present.      Right cervical: Superficial cervical adenopathy present.      Left cervical: Superficial cervical adenopathy present.   Neurological:      Mental Status: She is alert.   Psychiatric:         Attention and Perception: Attention normal.         Mood and Affect: Mood normal.         Speech: Speech normal.         Behavior: Behavior normal. Behavior is cooperative.         Thought Content: Thought content normal.         Judgment: Judgment normal.           Procedures  Procedures    UC Course       Medical Decision Making  Symptoms:    Vitals are stable.    Pt appears well in the office today.    Lungs clear on exam.      POC testing performed:    CXR:  Narrative & Impression  CLINICAL HISTORY: Cough     COMMENT: PA and lateral views of the chest were obtained and compared to prior  study from 2017.     There is no focal consolidation, pleural effusion, or pneumothorax. The heart  and mediastinum are within normal limits. The visualized osseous structures are  intact.     --  IMPRESSION: No acute disease in the chest.    COVID-19,flu, rsv testing sent to [facility].   Treatment: Continue to treat symptoms as needed.      Low threshold to be seen in the ED if symptoms worsen at any time.   Otherwise, results will be available to view on [facility].                       Katy Juares MD  02/23/25  [id number]

## 2025-02-24 LAB
FLUAV RNA SPEC QL NAA+PROBE: POSITIVE
FLUBV RNA SPEC QL NAA+PROBE: NEGATIVE
RSV RNA SPEC QL NAA+PROBE: NEGATIVE
SARS-COV-2 RNA RESP QL NAA+PROBE: NEGATIVE

## 2025-04-15 ENCOUNTER — HOSPITAL ENCOUNTER (OUTPATIENT)
Facility: CLINIC | Age: 75
Discharge: HOME | End: 2025-04-15
Attending: EMERGENCY MEDICINE
Payer: MEDICARE

## 2025-04-15 ENCOUNTER — APPOINTMENT (OUTPATIENT)
Dept: RADIOLOGY | Age: 75
End: 2025-04-15
Payer: MEDICARE

## 2025-04-15 VITALS
SYSTOLIC BLOOD PRESSURE: 141 MMHG | HEART RATE: 110 BPM | TEMPERATURE: 98.3 F | OXYGEN SATURATION: 98 % | DIASTOLIC BLOOD PRESSURE: 75 MMHG

## 2025-04-15 DIAGNOSIS — S92.155A CLOSED NONDISPLACED AVULSION FRACTURE OF LEFT TALUS, INITIAL ENCOUNTER: Primary | ICD-10-CM

## 2025-04-15 PROCEDURE — 73630 X-RAY EXAM OF FOOT: CPT | Mod: LT

## 2025-04-15 PROCEDURE — 99213 OFFICE O/P EST LOW 20 MIN: CPT

## 2025-04-15 ASSESSMENT — ENCOUNTER SYMPTOMS
ABDOMINAL PAIN: 0
BACK PAIN: 0
NAUSEA: 0
WOUND: 0
HEADACHES: 0
ARTHRALGIAS: 1
VOMITING: 0
PALPITATIONS: 0
CHILLS: 0
COUGH: 0
FATIGUE: 0
JOINT SWELLING: 1
SHORTNESS OF BREATH: 0
DIARRHEA: 0
DIZZINESS: 0
COLOR CHANGE: 0
CONFUSION: 0
FEVER: 0
NUMBNESS: 0
LIGHT-HEADEDNESS: 0
WEAKNESS: 0

## 2025-04-15 NOTE — DISCHARGE INSTRUCTIONS
Keep the foot elevated, try to use ice to help with the swelling   The bruising may continue to move down your foot, this is normal due to gravity  Continue to wear supportive shoes, take tylenol if needed for pain    Follow up with orthopedics for repeat x-rays if the pain does not improve

## 2025-04-15 NOTE — ED PROVIDER NOTES
Emergency Medicine Note  HPI   HISTORY OF PRESENT ILLNESS     75 y.o. female presents for left foot swelling, bruising, and pain after a twisting injury last week.   She is able to bear weight, has full ROM. Admits to pain to the dorsal foot that is worse with pressing on the area and when she puts on her shoe.   She denies numbness, tingling, or weakness. She is not on any blood thinners.          Patient History   PAST HISTORY     Reviewed from Nursing Triage:       Past Medical History:   Diagnosis Date    Arthritis     Bladder infection     Gallstones     Hemorrhoids     Hiatal hernia     HTN (hypertension)     Hypercholesterolemia        Past Surgical History   Procedure Laterality Date    Appendectomy      Cholecystectomy      Colonoscopy      Colonoscopy N/A 1/15/2025    Performed by Moo Bliss MD at [facility]    FLEXIBLE COLONOSCOPY PROXIMAL TO SPLENIC FLEXURE WITH REMOVAL OF TUMOR USING SNARE N/A 5/18/2018    Performed by Moo Bliss MD at [facility]       No family history on file.    Social History     Tobacco Use    Smoking status: Never    Smokeless tobacco: Never   Substance Use Topics    Alcohol use: No    Drug use: No         Review of Systems   REVIEW OF SYSTEMS     Review of Systems   Constitutional:  Negative for chills, fatigue and fever.   Respiratory:  Negative for cough and shortness of breath.    Cardiovascular:  Negative for chest pain and palpitations.   Gastrointestinal:  Negative for abdominal pain, diarrhea, nausea and vomiting.   Musculoskeletal:  Positive for arthralgias and joint swelling. Negative for back pain and gait problem.   Skin:  Negative for color change, pallor and wound.   Neurological:  Negative for dizziness, weakness, light-headedness, numbness and headaches.   Psychiatric/Behavioral:  Negative for confusion.          VITALS     ED Vitals      Date/Time Temp Pulse Resp BP SpO2 [facility]   04/15/25 1917 36.8 °C (98.3 °F) 110 -- 141/75 98 % DS                         Physical  Exam   PHYSICAL EXAM     Physical Exam  Vitals reviewed.   Constitutional:       Appearance: Normal appearance.   HENT:      Head: Normocephalic.      Nose: Nose normal.      Mouth/Throat:      Mouth: Mucous membranes are moist.   Cardiovascular:      Rate and Rhythm: Normal rate and regular rhythm.      Pulses:           Dorsalis pedis pulses are 2+ on the left side.        Posterior tibial pulses are 2+ on the left side.   Pulmonary:      Effort: Pulmonary effort is normal.   Musculoskeletal:         General: Tenderness present. Normal range of motion.      Left foot: Normal range of motion.        Feet:    Feet:      Left foot:      Skin integrity: Skin integrity normal.      Comments: Ecchymosis to dorsal foot at base of toes 2-5 along with ecchymosis proximally to dorsal foot and lateral ankle  Skin:     General: Skin is warm and dry.      Capillary Refill: Capillary refill takes less than 2 seconds.   Neurological:      Mental Status: She is alert and oriented to person, place, and time.   Psychiatric:         Behavior: Behavior normal.           PROCEDURES     Procedures     DATA     Results       None                No orders to display       Scoring tools                                  ED Course & MDM   MDM / ED COURSE / CLINICAL IMPRESSION / DISPO     Medical Decision Making  Left foot x-ray obtained. Avulsion fracture of left talus noted. Reviewed with patient.   Recommend rest, ice, Tylenol PRN for pain   Use supportive footwear, keep legs elevated   Follow up with orthopedics for further management. Pt states she has appt with her orthopedist next week and will discuss at that time  Patient verbalized understanding, in agreement with plan. All questions answered.           Clinical Impression      Closed nondisplaced avulsion fracture of left talus, initial encounter     _________________       ED Disposition   Discharge                       Aimee Dupont CRNP  04/15/25 2004

## 2025-04-16 NOTE — ED ATTESTATION NOTE
I was immediately available to provide supervision and direction, if requested, for the care of the patient. I did not see the patient nor was I consulted for any questions by the DINORA.        Ismael Johnston MD  04/15/25 2009

## 2025-06-06 ENCOUNTER — TRANSCRIBE ORDERS (OUTPATIENT)
Dept: SCHEDULING | Age: 75
End: 2025-06-06

## 2025-06-06 DIAGNOSIS — M25.572 LEFT ANKLE PAIN: Primary | ICD-10-CM

## 2025-06-20 ENCOUNTER — APPOINTMENT (EMERGENCY)
Dept: RADIOLOGY | Facility: HOSPITAL | Age: 75
End: 2025-06-20
Payer: MEDICARE

## 2025-06-20 ENCOUNTER — HOSPITAL ENCOUNTER (EMERGENCY)
Facility: HOSPITAL | Age: 75
Discharge: HOME | End: 2025-06-20
Attending: STUDENT IN AN ORGANIZED HEALTH CARE EDUCATION/TRAINING PROGRAM
Payer: MEDICARE

## 2025-06-20 VITALS
DIASTOLIC BLOOD PRESSURE: 65 MMHG | SYSTOLIC BLOOD PRESSURE: 131 MMHG | RESPIRATION RATE: 18 BRPM | HEIGHT: 65 IN | WEIGHT: 250 LBS | BODY MASS INDEX: 41.65 KG/M2 | HEART RATE: 64 BPM | OXYGEN SATURATION: 97 %

## 2025-06-20 DIAGNOSIS — R42 LIGHTHEADEDNESS: Primary | ICD-10-CM

## 2025-06-20 DIAGNOSIS — E87.6 HYPOKALEMIA: ICD-10-CM

## 2025-06-20 DIAGNOSIS — R55 NEAR SYNCOPE: ICD-10-CM

## 2025-06-20 LAB
ALBUMIN SERPL-MCNC: 3.9 G/DL (ref 3.5–5.7)
ALP SERPL-CCNC: 59 IU/L (ref 34–125)
ALT SERPL-CCNC: 12 IU/L (ref 7–52)
ANION GAP SERPL CALC-SCNC: 14 MEQ/L (ref 3–15)
AST SERPL-CCNC: 15 IU/L (ref 13–39)
BASOPHILS # BLD: 0.03 K/UL (ref 0.01–0.1)
BASOPHILS NFR BLD: 0.4 %
BILIRUB SERPL-MCNC: 1.1 MG/DL (ref 0.3–1.2)
BUN SERPL-MCNC: 26 MG/DL (ref 7–25)
CALCIUM SERPL-MCNC: 10.4 MG/DL (ref 8.6–10.3)
CHLORIDE SERPL-SCNC: 102 MEQ/L (ref 98–107)
CO2 SERPL-SCNC: 24 MEQ/L (ref 21–31)
CREAT SERPL-MCNC: 1 MG/DL (ref 0.6–1.2)
DIFFERENTIAL METHOD BLD: NORMAL
EGFRCR SERPLBLD CKD-EPI 2021: 58.1 ML/MIN/1.73M*2
EOSINOPHIL # BLD: 0.07 K/UL (ref 0.04–0.36)
EOSINOPHIL NFR BLD: 1 %
ERYTHROCYTE [DISTWIDTH] IN BLOOD BY AUTOMATED COUNT: 12.1 % (ref 11.7–14.4)
GLUCOSE SERPL-MCNC: 115 MG/DL (ref 70–99)
HCT VFR BLD AUTO: 42.2 % (ref 35–45)
HGB BLD-MCNC: 14.1 G/DL (ref 11.8–15.7)
IMM GRANULOCYTES # BLD AUTO: 0.02 K/UL (ref 0–0.08)
IMM GRANULOCYTES NFR BLD AUTO: 0.3 %
LYMPHOCYTES # BLD: 1.62 K/UL (ref 1.2–3.5)
LYMPHOCYTES NFR BLD: 22.5 %
MCH RBC QN AUTO: 31.4 PG (ref 28–33.2)
MCHC RBC AUTO-ENTMCNC: 33.4 G/DL (ref 32.2–35.5)
MCV RBC AUTO: 94 FL (ref 83–98)
MONOCYTES # BLD: 0.73 K/UL (ref 0.28–0.8)
MONOCYTES NFR BLD: 10.1 %
NEUTROPHILS # BLD: 4.73 K/UL (ref 1.7–7)
NEUTS SEG NFR BLD: 65.7 %
NRBC BLD-RTO: 0 %
PLATELET # BLD AUTO: 255 K/UL (ref 150–369)
PMV BLD AUTO: 10.7 FL (ref 9.4–12.3)
POTASSIUM SERPL-SCNC: 3.2 MEQ/L (ref 3.5–5.1)
PROT SERPL-MCNC: 7.2 G/DL (ref 6–8.2)
RBC # BLD AUTO: 4.49 M/UL (ref 3.93–5.22)
SODIUM SERPL-SCNC: 140 MEQ/L (ref 136–145)
TROPONIN I SERPL HS-MCNC: 5.9 PG/ML
TROPONIN I SERPL HS-MCNC: 6.5 PG/ML
WBC # BLD AUTO: 7.2 K/UL (ref 3.8–10.5)

## 2025-06-20 PROCEDURE — 93005 ELECTROCARDIOGRAM TRACING: CPT

## 2025-06-20 PROCEDURE — 93005 ELECTROCARDIOGRAM TRACING: CPT | Performed by: STUDENT IN AN ORGANIZED HEALTH CARE EDUCATION/TRAINING PROGRAM

## 2025-06-20 PROCEDURE — 63700000 HC SELF-ADMINISTRABLE DRUG

## 2025-06-20 PROCEDURE — 71045 X-RAY EXAM CHEST 1 VIEW: CPT

## 2025-06-20 PROCEDURE — 99283 EMERGENCY DEPT VISIT LOW MDM: CPT | Mod: 25

## 2025-06-20 PROCEDURE — 84484 ASSAY OF TROPONIN QUANT: CPT | Mod: 91 | Performed by: STUDENT IN AN ORGANIZED HEALTH CARE EDUCATION/TRAINING PROGRAM

## 2025-06-20 PROCEDURE — 80053 COMPREHEN METABOLIC PANEL: CPT | Performed by: STUDENT IN AN ORGANIZED HEALTH CARE EDUCATION/TRAINING PROGRAM

## 2025-06-20 PROCEDURE — 36415 COLL VENOUS BLD VENIPUNCTURE: CPT | Performed by: STUDENT IN AN ORGANIZED HEALTH CARE EDUCATION/TRAINING PROGRAM

## 2025-06-20 PROCEDURE — 85025 COMPLETE CBC W/AUTO DIFF WBC: CPT | Performed by: STUDENT IN AN ORGANIZED HEALTH CARE EDUCATION/TRAINING PROGRAM

## 2025-06-20 PROCEDURE — 84484 ASSAY OF TROPONIN QUANT: CPT | Performed by: STUDENT IN AN ORGANIZED HEALTH CARE EDUCATION/TRAINING PROGRAM

## 2025-06-20 RX ORDER — POTASSIUM CHLORIDE 20 MEQ/1
40 TABLET, EXTENDED RELEASE ORAL ONCE
Status: COMPLETED | OUTPATIENT
Start: 2025-06-20 | End: 2025-06-20

## 2025-06-20 RX ADMIN — POTASSIUM CHLORIDE 40 MEQ: 1500 TABLET, EXTENDED RELEASE ORAL at 18:12

## 2025-06-20 ASSESSMENT — ENCOUNTER SYMPTOMS
VOMITING: 1
LIGHT-HEADEDNESS: 1

## 2025-06-20 NOTE — ED ATTESTATION NOTE
I have personally seen and examined Veronica Constantino.  I was involved in the care and medical decision making for this patient.    I reviewed and agree with physician assistant / nurse practitioner’s assessment and plan of care; any exceptions are documented below.      My focused history, examination, assessment and plan of care of Veronica Constantino is as follows:    Brief History:  HPI    75-year-old female presenting to the emergency department for evaluation of lightheadedness.  Patient states that she was at home on the toilet.  She went to get up and felt suddenly lightheaded and had to sit back down.  Patient did not syncopized.  No chest pain or trouble breathing.    Focused Physical Exam:  Physical Exam    On focused examination, patient is afebrile and hemodynamically stable.  She is in no acute distress.  Lungs clear to auscultation.  Heart RRR.  Abdomen is soft and nontender.  She has a nonfocal neurologic exam.    Assessment / Plan:        Medical Decision Making  Patient presented to the emergency department after lightheadedness when getting up from the toilet earlier.  Patient is well-appearing on arrival, no acute distress.  Patient denied ever having any chest pain or trouble breathing.  Patient's laboratory work appears all unremarkable and reassuring.  Troponins flat x 2.  She is at her baseline.  Stable for discharge with outpatient follow-up.    Amount and/or Complexity of Data Reviewed  Labs: ordered. Decision-making details documented in ED Course.  Radiology: ordered. Decision-making details documented in ED Course.  ECG/medicine tests: ordered and independent interpretation performed. Decision-making details documented in ED Course.        I was physically present for the key/critical portions of the following procedures:  Procedures          Bart Mg MD  06/20/25 1800     Commercial beverage/as medically feasible

## 2025-06-20 NOTE — DISCHARGE INSTRUCTIONS
Return to the ED for any chest pain, trouble breathing, nausea, sweating, vomiting, cough, fevers, weakness or numbness, or any worsening of symptoms.     Follow up with your healthcare provider for re-evaluation.

## 2025-06-20 NOTE — ED PROVIDER NOTES
Emergency Medicine Note  HPI   HISTORY OF PRESENT ILLNESS     Patient is a 75-year-old female with a past medical history of HTN and HLD presenting to the emergency department for evaluation of lightheadedness.  Patient states that x 2 months ago, she injured her left foot and has a small fracture that she has been follow with orthopedics for.  She reports that she has been overcompensating for the injured left foot and now has right knee pain.  Patient ambulates with a walker at baseline.  Today, patient states that she went to urinate, was on the toilet and had an episode of nonbloody, bilious vomit.  Patient states that she became lightheaded and was having difficulty standing up so she activated EMS.  Patient did not lose consciousness, she did not hit her head.  No blood thinning medication. Denies any room spinning dizziness.      History provided by:  Patient        Patient History   PAST HISTORY     Reviewed from Nursing Triage:       Past Medical History:   Diagnosis Date    Arthritis     Bladder infection     Gallstones     Hemorrhoids     Hiatal hernia     HTN (hypertension)     Hypercholesterolemia        Past Surgical History   Procedure Laterality Date    Appendectomy      Cholecystectomy      Colonoscopy      Colonoscopy N/A 1/15/2025    Performed by Moo Bliss MD at [facility]    FLEXIBLE COLONOSCOPY PROXIMAL TO SPLENIC FLEXURE WITH REMOVAL OF TUMOR USING SNARE N/A 5/18/2018    Performed by Moo Bliss MD at [facility]       No family history on file.    Social History     Tobacco Use    Smoking status: Never    Smokeless tobacco: Never   Substance Use Topics    Alcohol use: No    Drug use: No         Review of Systems   REVIEW OF SYSTEMS     Review of Systems   Gastrointestinal:  Positive for vomiting.   Neurological:  Positive for light-headedness.   All other systems reviewed and are negative.        VITALS     ED Vitals      Date/Time Temp Pulse Resp BP SpO2 [facility]   06/20/25 1716 -- 64 18 131/65 97  % SK   06/20/25 1518 -- 86 24 130/60 100 % CK          Pulse Ox %: 100 % (06/20/25 1552)  Pulse Ox Interpretation: Normal (06/20/25 1552)           Physical Exam   PHYSICAL EXAM     Physical Exam  Vitals and nursing note reviewed.   Constitutional:       Appearance: Normal appearance.      Comments: Patient is in no acute distress.  She is lying comfortably on the stretcher.  She answers questions quickly, appropriately and speaks in full sentences.  She makes good eye contact throughout examination.   HENT:      Head: Normocephalic and atraumatic.      Nose: Nose normal.      Mouth/Throat:      Mouth: Mucous membranes are moist.      Pharynx: Oropharynx is clear.   Eyes:      General: No visual field deficit.     Conjunctiva/sclera: Conjunctivae normal.   Cardiovascular:      Rate and Rhythm: Normal rate and regular rhythm.   Pulmonary:      Effort: Pulmonary effort is normal.      Breath sounds: Normal breath sounds.   Abdominal:      General: Abdomen is flat.      Palpations: Abdomen is soft.      Tenderness: There is no abdominal tenderness.   Musculoskeletal:         General: Normal range of motion.      Cervical back: Normal range of motion and neck supple.   Skin:     General: Skin is warm and dry.      Capillary Refill: Capillary refill takes less than 2 seconds.   Neurological:      Mental Status: She is alert. Mental status is at baseline.      Cranial Nerves: Cranial nerves 2-12 are intact. No dysarthria or facial asymmetry.      Sensory: Sensation is intact.      Motor: Motor function is intact.      Coordination: Coordination is intact.           PROCEDURES     Procedures     DATA     Results       Procedure Component Value Units Date/Time    HS Troponin (with 2 hour reflex) [[id number]]  (Normal) Collected: 06/20/25 1524    Specimen: Blood, Venous Updated: 06/20/25 1607     High Sens Troponin I 5.9 pg/mL     Comprehensive metabolic panel [[id number]]  (Abnormal) Collected: 06/20/25 1524    Specimen:  Blood, Venous Updated: 06/20/25 1601     Sodium 140 mEQ/L      Potassium 3.2 mEQ/L      Comment: Results obtained on plasma. Plasma Potassium values may be up to 0.4 mEQ/L less than serum values. The differences may be greater for patients with high platelet or white cell counts.        Chloride 102 mEQ/L      CO2 24 mEQ/L      BUN 26 mg/dL      Creatinine 1.0 mg/dL      Glucose 115 mg/dL      Calcium 10.4 mg/dL      AST (SGOT) 15 IU/L      ALT (SGPT) 12 IU/L      Alkaline Phosphatase 59 IU/L      Total Protein 7.2 g/dL      Comment: Test performed on plasma which typically contains approximately 0.4 g/dL more protein than serum.        Albumin 3.9 g/dL      Bilirubin, Total 1.1 mg/dL      eGFR 58.1 mL/min/1.73m*2      Comment: Calculation based on the Chronic Kidney Disease Epidemiology Collaboration (CKD-EPI) equation refit without adjustment for race.        Anion Gap 14 mEQ/L     CBC and differential [[id number]] Collected: 06/20/25 1524    Specimen: Blood, Venous Updated: 06/20/25 1539     WBC 7.20 K/uL      RBC 4.49 M/uL      Hemoglobin 14.1 g/dL      Hematocrit 42.2 %      MCV 94.0 fL      MCH 31.4 pg      MCHC 33.4 g/dL      RDW 12.1 %      Platelets 255 K/uL      MPV 10.7 fL      Differential Type Auto     nRBC 0.0 %      Immature Granulocytes 0.3 %      Neutrophils 65.7 %      Lymphocytes 22.5 %      Monocytes 10.1 %      Eosinophils 1.0 %      Basophils 0.4 %      Immature Granulocytes, Absolute 0.02 K/uL      Neutrophils, Absolute 4.73 K/uL      Lymphocytes, Absolute 1.62 K/uL      Monocytes, Absolute 0.73 K/uL      Eosinophils, Absolute 0.07 K/uL      Basophils, Absolute 0.03 K/uL     [facility] Draw Panel [[id number]] Collected: 06/20/25 1524    Specimen: Blood, Venous Updated: 06/20/25 1529    Narrative:      The following orders were created for panel order [facility] Draw Panel.  Procedure                               Abnormality         Status                     ---------                                -----------         ------                     RAUL BATES[[id number]]                                  In process                   Please view results for these tests on the individual orders.    RAUL BATES [[id number]] Collected: 06/20/25 1524    Specimen: Blood, Venous Updated: 06/20/25 1529            Imaging Results              X-RAY CHEST 1 VIEW (Final result)  Result time 06/20/25 15:47:38      Final result                   Impression:    IMPRESSION:    No active disease seen in the chest.               Narrative:    CLINICAL HISTORY: Short of breath    COMPARISON: X-ray 2/23/2025    COMMENT:    A single view of the chest was obtained on 6/20/2025 at 15:25.    Heart size and mediastinal contour within normal range.  Mild left basilar  atelectasis.  Lungs otherwise appear clear.  No pleural effusion or pneumothorax  seen.  No acute osseous finding.                                      ECG 12 lead   Independent Interpretation by ED Provider   Normal sinus rhythm at a rate of 77 bpm.  Frequent PACs      ECG 12 lead   Independent Interpretation by ED Provider   Normal sinus rhythm at a rate of 86 bpm, frequent PACs          Scoring tools                                  ED Course & MDM   MDM / ED COURSE / CLINICAL IMPRESSION / DISPO     Medical Decision Making  Problems Addressed:  Hypokalemia: acute illness or injury  Lightheadedness: acute illness or injury  Near syncope: acute illness or injury    Amount and/or Complexity of Data Reviewed  Independent Historian: spouse and EMS  External Data Reviewed: notes.  Labs: ordered. Decision-making details documented in ED Course.  Radiology: ordered. Decision-making details documented in ED Course.  ECG/medicine tests: ordered and independent interpretation performed. Decision-making details documented in ED Course.    Risk  Prescription drug management.        ED Course as of 06/20/25 2145   Fri Jun 20, 2025   1551 CBC and differential  No  leucocytosis    No anemia  [AS]   1552 X-RAY CHEST 1 VIEW  IMPRESSION:     No active disease seen in the chest.   [AS]   1610 High Sens Troponin I: 5.9  1st [AS]   1726 ECG 12 lead  NSR with occasional PAC's [AS]   1803 High Sens Troponin I: 6.5  2nd     No significant delta [AS]   1808 Potassium, Bld(!): 3.2  Repletion ordered [AS]   1812 Patient reassessed, she reports feeling well. Updated with all test results.    Recommended close follow up with PCP/orthopedics.     Patient is now stable for discharge.  Return precautions discussed, patient verbalizes understanding.  Questions answered prior to discharge.   [AS]      ED Course User Index  [AS] Leatha Del Castillo PA C     Clinical Impression      Lightheadedness   Near syncope   Hypokalemia     _________________       ED Disposition   Discharge                       Leatha Del Castillo PA C  06/20/25 0152

## 2025-06-21 LAB
ATRIAL RATE: 77
P AXIS: 0
PR INTERVAL: 154
QRS DURATION: 90
QRS DURATION: 96
QT INTERVAL: 376
QT INTERVAL: 406
QTC CALCULATION(BAZETT): 449
QTC CALCULATION(BAZETT): 459
R AXIS: 14
R AXIS: 3
T WAVE AXIS: 31
T WAVE AXIS: 35
VENTRICULAR RATE: 77
VENTRICULAR RATE: 86

## 2025-06-23 ENCOUNTER — APPOINTMENT (OUTPATIENT)
Dept: RADIOLOGY | Age: 75
End: 2025-06-23
Attending: FAMILY MEDICINE
Payer: MEDICARE

## 2025-06-23 DIAGNOSIS — M25.572 LEFT ANKLE PAIN: ICD-10-CM

## 2025-06-23 PROCEDURE — 73721 MRI JNT OF LWR EXTRE W/O DYE: CPT | Mod: LT

## 2025-07-16 ENCOUNTER — APPOINTMENT (OUTPATIENT)
Dept: LAB | Facility: HOSPITAL | Age: 75
End: 2025-07-16
Attending: INTERNAL MEDICINE
Payer: MEDICARE

## 2025-07-16 DIAGNOSIS — I10 HYPERTENSION: ICD-10-CM

## 2025-07-16 LAB
ALBUMIN SERPL-MCNC: 3.9 G/DL (ref 3.5–5.7)
ALP SERPL-CCNC: 61 IU/L (ref 34–125)
ALT SERPL-CCNC: 9 IU/L (ref 7–52)
ANION GAP SERPL CALC-SCNC: 6 MEQ/L (ref 3–15)
AST SERPL-CCNC: 15 IU/L (ref 13–39)
BASOPHILS # BLD: 0.04 K/UL (ref 0.01–0.1)
BASOPHILS NFR BLD: 0.7 %
BILIRUB SERPL-MCNC: 0.7 MG/DL (ref 0.3–1.2)
BUN SERPL-MCNC: 13 MG/DL (ref 7–25)
CALCIUM SERPL-MCNC: 9.4 MG/DL (ref 8.6–10.3)
CHLORIDE SERPL-SCNC: 107 MEQ/L (ref 98–107)
CO2 SERPL-SCNC: 28 MEQ/L (ref 21–31)
CREAT SERPL-MCNC: 0.8 MG/DL (ref 0.6–1.2)
DIFFERENTIAL METHOD BLD: ABNORMAL
EGFRCR SERPLBLD CKD-EPI 2021: >60 ML/MIN/1.73M*2
EOSINOPHIL # BLD: 0.16 K/UL (ref 0.04–0.36)
EOSINOPHIL NFR BLD: 2.7 %
ERYTHROCYTE [DISTWIDTH] IN BLOOD BY AUTOMATED COUNT: 14.3 % (ref 11.7–14.4)
ERYTHROCYTE [SEDIMENTATION RATE] IN BLOOD BY WESTERGREN METHOD: 34 MM/HR
FT4I SERPL CALC-MCNC: 4.83 (ref 2.1–3.8)
GLUCOSE SERPL-MCNC: 95 MG/DL (ref 70–99)
HCT VFR BLD AUTO: 40.9 % (ref 35–45)
HGB BLD-MCNC: 12.9 G/DL (ref 11.8–15.7)
IMM GRANULOCYTES # BLD AUTO: 0.02 K/UL (ref 0–0.08)
IMM GRANULOCYTES NFR BLD AUTO: 0.3 %
LYMPHOCYTES # BLD: 1.97 K/UL (ref 1.2–3.5)
LYMPHOCYTES NFR BLD: 33 %
MCH RBC QN AUTO: 30.6 PG (ref 28–33.2)
MCHC RBC AUTO-ENTMCNC: 31.5 G/DL (ref 32.2–35.5)
MCV RBC AUTO: 97.1 FL (ref 83–98)
MONOCYTES # BLD: 0.63 K/UL (ref 0.28–0.8)
MONOCYTES NFR BLD: 10.6 %
NEUTROPHILS # BLD: 3.15 K/UL (ref 1.7–7)
NEUTS SEG NFR BLD: 52.7 %
NRBC BLD-RTO: 0 %
PLATELET # BLD AUTO: 183 K/UL (ref 150–369)
PMV BLD AUTO: 11.7 FL (ref 9.4–12.3)
POTASSIUM SERPL-SCNC: 4.1 MEQ/L (ref 3.5–5.1)
PROT SERPL-MCNC: 6 G/DL (ref 6–8.2)
RBC # BLD AUTO: 4.21 M/UL (ref 3.93–5.22)
SODIUM SERPL-SCNC: 141 MEQ/L (ref 136–145)
T4 SERPL-MCNC: 11.1 UG/DL (ref 5.9–12.2)
TSH SERPL DL<=0.05 MIU/L-ACNC: 2.05 MIU/L (ref 0.34–5.6)
WBC # BLD AUTO: 5.97 K/UL (ref 3.8–10.5)

## 2025-07-16 PROCEDURE — 36415 COLL VENOUS BLD VENIPUNCTURE: CPT

## 2025-07-16 PROCEDURE — 84443 ASSAY THYROID STIM HORMONE: CPT

## 2025-07-16 PROCEDURE — 85025 COMPLETE CBC W/AUTO DIFF WBC: CPT

## 2025-07-16 PROCEDURE — 85652 RBC SED RATE AUTOMATED: CPT

## 2025-07-16 PROCEDURE — 84436 ASSAY OF TOTAL THYROXINE: CPT

## 2025-07-16 PROCEDURE — 80053 COMPREHEN METABOLIC PANEL: CPT

## 2025-08-21 ENCOUNTER — APPOINTMENT (OUTPATIENT)
Dept: RADIOLOGY | Age: 75
End: 2025-08-21
Attending: NURSE PRACTITIONER
Payer: MEDICARE

## 2025-08-21 ENCOUNTER — HOSPITAL ENCOUNTER (OUTPATIENT)
Facility: CLINIC | Age: 75
Discharge: HOME | End: 2025-08-21
Attending: FAMILY MEDICINE
Payer: MEDICARE

## 2025-08-21 VITALS
DIASTOLIC BLOOD PRESSURE: 80 MMHG | TEMPERATURE: 97.5 F | HEART RATE: 64 BPM | OXYGEN SATURATION: 98 % | WEIGHT: 250 LBS | SYSTOLIC BLOOD PRESSURE: 133 MMHG | HEIGHT: 64 IN | BODY MASS INDEX: 42.68 KG/M2

## 2025-08-21 DIAGNOSIS — M79.672 LEFT FOOT PAIN: Primary | ICD-10-CM

## 2025-08-21 PROCEDURE — 73630 X-RAY EXAM OF FOOT: CPT | Mod: LT | Performed by: NURSE PRACTITIONER

## 2025-08-21 PROCEDURE — 99214 OFFICE O/P EST MOD 30 MIN: CPT | Performed by: NURSE PRACTITIONER

## 2025-08-21 ASSESSMENT — ENCOUNTER SYMPTOMS
FATIGUE: 0
FEVER: 0
JOINT SWELLING: 1

## 2025-08-27 ENCOUNTER — TELEPHONE (OUTPATIENT)
Dept: ORTHOPEDICS | Facility: CLINIC | Age: 75
End: 2025-08-27
Payer: MEDICARE

## (undated) DEVICE — SOLN IV 0.9% NSS 1000ML

## (undated) DEVICE — TUBE CONNECTOR CO2 OLYMPUS

## (undated) DEVICE — SNARE POLYPECTOMY 25MM

## (undated) DEVICE — CONNECTOR PORT W/VALVE FOR OLYMPUS 160/180 SCOPE

## (undated) DEVICE — SET TUBING/CAP HYBRID CO2 CLEVERCAP

## (undated) DEVICE — PAD GROUNDING POLYHESIVE

## (undated) DEVICE — TUBE SUCTION 1/4INX20FT STERILE